# Patient Record
Sex: FEMALE | Race: WHITE | NOT HISPANIC OR LATINO
[De-identification: names, ages, dates, MRNs, and addresses within clinical notes are randomized per-mention and may not be internally consistent; named-entity substitution may affect disease eponyms.]

---

## 2021-01-01 ENCOUNTER — RESULT REVIEW (OUTPATIENT)
Age: 86
End: 2021-01-01

## 2021-01-01 ENCOUNTER — NON-APPOINTMENT (OUTPATIENT)
Age: 86
End: 2021-01-01

## 2021-01-01 ENCOUNTER — APPOINTMENT (OUTPATIENT)
Dept: OPHTHALMOLOGY | Facility: CLINIC | Age: 86
End: 2021-01-01
Payer: MEDICARE

## 2021-01-01 PROCEDURE — 92014 COMPRE OPH EXAM EST PT 1/>: CPT

## 2021-01-01 PROCEDURE — 92250 FUNDUS PHOTOGRAPHY W/I&R: CPT

## 2022-01-01 ENCOUNTER — NON-APPOINTMENT (OUTPATIENT)
Age: 87
End: 2022-01-01

## 2022-01-01 ENCOUNTER — RESULT REVIEW (OUTPATIENT)
Age: 87
End: 2022-01-01

## 2022-01-01 ENCOUNTER — APPOINTMENT (OUTPATIENT)
Dept: OPHTHALMOLOGY | Facility: CLINIC | Age: 87
End: 2022-01-01
Payer: MEDICARE

## 2022-01-01 ENCOUNTER — APPOINTMENT (OUTPATIENT)
Age: 87
End: 2022-01-01
Payer: MEDICARE

## 2022-01-01 VITALS
DIASTOLIC BLOOD PRESSURE: 50 MMHG | WEIGHT: 110 LBS | SYSTOLIC BLOOD PRESSURE: 118 MMHG | OXYGEN SATURATION: 98 % | HEART RATE: 68 BPM | RESPIRATION RATE: 15 BRPM

## 2022-01-01 DIAGNOSIS — I50.9 HEART FAILURE, UNSPECIFIED: ICD-10-CM

## 2022-01-01 DIAGNOSIS — Z60.2 PROBLEMS RELATED TO LIVING ALONE: ICD-10-CM

## 2022-01-01 DIAGNOSIS — N18.9 CHRONIC KIDNEY DISEASE, UNSPECIFIED: ICD-10-CM

## 2022-01-01 DIAGNOSIS — D64.9 ANEMIA, UNSPECIFIED: ICD-10-CM

## 2022-01-01 DIAGNOSIS — K92.2 GASTROINTESTINAL HEMORRHAGE, UNSPECIFIED: ICD-10-CM

## 2022-01-01 DIAGNOSIS — I71.4 ABDOMINAL AORTIC ANEURYSM, W/OUT RUPTURE: ICD-10-CM

## 2022-01-01 DIAGNOSIS — Z85.118 PERSONAL HISTORY OF OTHER MALIGNANT NEOPLASM OF BRONCHUS AND LUNG: ICD-10-CM

## 2022-01-01 DIAGNOSIS — K57.90 DIVERTICULOSIS OF INTESTINE, PART UNSPECIFIED, W/OUT PERFORATION OR ABSCESS W/OUT BLEEDING: ICD-10-CM

## 2022-01-01 DIAGNOSIS — J45.909 UNSPECIFIED ASTHMA, UNCOMPLICATED: ICD-10-CM

## 2022-01-01 PROCEDURE — 99348 HOME/RES VST EST LOW MDM 30: CPT

## 2022-01-01 PROCEDURE — 92134 CPTRZ OPH DX IMG PST SGM RTA: CPT

## 2022-01-01 PROCEDURE — 92014 COMPRE OPH EXAM EST PT 1/>: CPT

## 2022-01-01 RX ORDER — ATORVASTATIN CALCIUM 40 MG/1
40 TABLET, FILM COATED ORAL
Refills: 0 | Status: ACTIVE | COMMUNITY

## 2022-01-01 RX ORDER — ASPIRIN ENTERIC COATED TABLETS 81 MG 81 MG/1
81 TABLET, DELAYED RELEASE ORAL DAILY
Refills: 0 | Status: ACTIVE | COMMUNITY

## 2022-01-01 RX ORDER — BUDESONIDE AND FORMOTEROL FUMARATE DIHYDRATE 160; 4.5 UG/1; UG/1
160-4.5 AEROSOL RESPIRATORY (INHALATION) TWICE DAILY
Qty: 1 | Refills: 0 | Status: ACTIVE | COMMUNITY

## 2022-01-01 RX ORDER — FAMOTIDINE 20 MG/1
20 TABLET, FILM COATED ORAL
Refills: 0 | Status: ACTIVE | COMMUNITY

## 2022-01-01 RX ORDER — ALBUTEROL SULFATE 90 UG/1
108 (90 BASE) AEROSOL, METERED RESPIRATORY (INHALATION)
Refills: 0 | Status: ACTIVE | COMMUNITY

## 2022-01-01 RX ORDER — FUROSEMIDE 40 MG/1
40 TABLET ORAL
Qty: 90 | Refills: 0 | Status: ACTIVE | COMMUNITY

## 2022-01-01 RX ORDER — EZETIMIBE 10 MG/1
10 TABLET ORAL
Refills: 0 | Status: ACTIVE | COMMUNITY

## 2022-01-01 RX ORDER — CARVEDILOL 25 MG/1
25 TABLET, FILM COATED ORAL TWICE DAILY
Qty: 60 | Refills: 0 | Status: ACTIVE | COMMUNITY

## 2022-01-01 SDOH — SOCIAL STABILITY - SOCIAL INSECURITY: PROBLEMS RELATED TO LIVING ALONE: Z60.2

## 2022-05-13 PROBLEM — D64.9 ANEMIA: Status: ACTIVE | Noted: 2022-01-01

## 2022-05-13 PROBLEM — I50.9 CHF (CONGESTIVE HEART FAILURE): Status: ACTIVE | Noted: 2022-01-01

## 2022-05-13 PROBLEM — K57.90 DIVERTICULOSIS: Status: ACTIVE | Noted: 2022-01-01

## 2022-05-13 PROBLEM — Z00.00 ENCOUNTER FOR PREVENTIVE HEALTH EXAMINATION: Status: ACTIVE | Noted: 2022-01-01

## 2022-05-13 PROBLEM — N18.9 CKD (CHRONIC KIDNEY DISEASE): Status: ACTIVE | Noted: 2022-01-01

## 2022-05-13 PROBLEM — K92.2 GI BLEED: Status: ACTIVE | Noted: 2022-01-01

## 2022-05-13 PROBLEM — Z60.2 LIVES ALONE WITH HELP AVAILABLE: Status: ACTIVE | Noted: 2022-01-01

## 2022-05-13 PROBLEM — I71.4 AAA (ABDOMINAL AORTIC ANEURYSM): Status: ACTIVE | Noted: 2022-01-01

## 2022-05-13 PROBLEM — Z85.118 HISTORY OF MALIGNANT NEOPLASM OF LUNG: Status: RESOLVED | Noted: 2022-01-01 | Resolved: 2022-01-01

## 2022-05-13 PROBLEM — J45.909 ASTHMA: Status: ACTIVE | Noted: 2022-01-01

## 2022-05-13 NOTE — PHYSICAL EXAM
[No Acute Distress] : no acute distress [Well Nourished] : well nourished [Well-Appearing] : well-appearing [No Respiratory Distress] : no respiratory distress  [Clear to Auscultation] : lungs were clear to auscultation bilaterally [No Accessory Muscle Use] : no accessory muscle use [Normal Rate] : normal rate  [Regular Rhythm] : with a regular rhythm [Normal S1, S2] : normal S1 and S2 [No Murmur] : no murmur heard [Pedal Pulses Present] : the pedal pulses are present [No Edema] : there was no peripheral edema [Normal Gait] : normal gait [Coordination Grossly Intact] : coordination grossly intact [No Focal Deficits] : no focal deficits [Speech Grossly Normal] : speech grossly normal [Normal Affect] : the affect was normal [Alert and Oriented x3] : oriented to person, place, and time [Normal Mood] : the mood was normal

## 2022-05-13 NOTE — ASSESSMENT
[FreeTextEntry1] : Shae Larose is being seen after discharge home from [facility]. She was admitted on 4/28/22 for CHF and discharged home on 5/2/22.  Discharge medications were reviewed and reconciled with the current medication list and medications in home.\par \par 1)CHF-EF-41% denies SOB, cough, LE edema, weight stable at 110lbs\par -On Lasix, beta blocker\par \par [employer] TCM teaching: Enforced with patient need for daily weights. Advised to call for an increase of greater than 2 lbs. in a day or 5 pounds in a week. Adhere to low salt diet and educated patient on foods that should be avoided such as processed or fast food. Limit fluids to 1 liter a day which is 4-5 glasses. Continue medications as ordered.  Follow up with Cardiologist. Contact information given, patient advised to call with any questions/concerns. \par \par 2)ASthma-No wheezing, cough, fever, chills\par Pt using albuterol prn\par Symbicort BID-reinforced rinsing mouth after use\par Enc pt to use albuterol if SOB when active, cough, wheezing\par f/u with PCP\par \par Reminded of TCM program and encouraged pt to call with any questions or concerns and especially with worsening of symptoms. Verbalized understanding.\par

## 2022-05-13 NOTE — HISTORY OF PRESENT ILLNESS
[Post-hospitalization from ___ Hospital] : Post-hospitalization from [unfilled] Hospital [Admitted on: ___] : The patient was admitted on [unfilled] [Discharged on ___] : discharged on [unfilled] [FreeTextEntry2] : Copied from EMR, "HOSPITAL COURSE: p presented to er with sob, hypothermia. pt diagnosed with acute resp failure, acute on chron syst chf exacerbation, hypothermia. pt strated on bipap, and aggressive diuresis, no signs of infection was found. also pt has sonali on ckd. pt was aggressively diuresed, weaned form bipap, cont with nc, and later was weaned form oxygen. pt diuresed, sob resolved, cr improved to baseline, lasix changed to oral, pt cleared by cardio for d/c. acute resp failure- resolved. pt ambulating, tolerating diet, had bm, stable for d/c.\par pt anemia sec to chron disease, for now- h/h stable, no active bleeding, cont monitoring, f/up with gi as outpatient for planned work up. pt electrolyte abnormalities corrected. chron medical problems under control, stable for d/c. Diet cardiac, regular, medications see discharge instructions, follow up pcp, cardiology, pulmonolgoy, activity as tolerated. patient seen, examined on discharge day, stable for discharge home with home care. pt understand and acknowledge and agree with discharge plan, medication regimen, and importance of f/ups after discharge."\par \par Pt seen in her home today for transitional care management. She denies SOB, cough, LE edema. Her weight is stable She had homecare but decided to discontinue as she feels better and "likes my own schedule". Her son lives in the home upstairs and assists patient.

## 2022-05-13 NOTE — PLAN
[FreeTextEntry1] : card Dr. Cuevaspt's son called office for f/u awaiting call back for appointment.

## 2022-05-13 NOTE — CURRENT MEDS
[Takes medication as prescribed] : takes [None] : Patient does not have any barriers to medication adherence [Yes] : Reviewed medication list for presence of high-risk medications. [Blood Thinners] : blood thinners [FreeTextEntry1] : ASA